# Patient Record
Sex: MALE | Race: ASIAN | NOT HISPANIC OR LATINO | Employment: FULL TIME | URBAN - METROPOLITAN AREA
[De-identification: names, ages, dates, MRNs, and addresses within clinical notes are randomized per-mention and may not be internally consistent; named-entity substitution may affect disease eponyms.]

---

## 2023-09-19 ENCOUNTER — DOCUMENTATION (OUTPATIENT)
Dept: CARDIOLOGY | Facility: HOSPITAL | Age: 58
End: 2023-09-19
Payer: COMMERCIAL

## 2024-01-04 DIAGNOSIS — E78.00 PURE HYPERCHOLESTEROLEMIA, UNSPECIFIED: ICD-10-CM

## 2024-01-04 RX ORDER — ATORVASTATIN CALCIUM 80 MG/1
80 TABLET, FILM COATED ORAL DAILY
Qty: 90 TABLET | Refills: 1 | Status: SHIPPED | OUTPATIENT
Start: 2024-01-04

## 2024-04-19 PROBLEM — R93.1 AGATSTON CORONARY ARTERY CALCIUM SCORE GREATER THAN 400: Status: ACTIVE | Noted: 2024-04-19

## 2024-04-19 PROBLEM — I25.10 3-VESSEL CAD: Status: ACTIVE | Noted: 2024-04-19

## 2024-04-19 PROBLEM — E55.9 VITAMIN D DEFICIENCY: Status: ACTIVE | Noted: 2024-04-19

## 2024-04-19 PROBLEM — R07.89 CHEST PAIN, MIDSTERNAL: Status: ACTIVE | Noted: 2024-04-19

## 2024-04-19 PROBLEM — E11.9 DIABETES (MULTI): Status: ACTIVE | Noted: 2024-04-19

## 2024-04-19 PROBLEM — I10 BENIGN ESSENTIAL HYPERTENSION: Status: ACTIVE | Noted: 2024-04-19

## 2024-04-19 PROBLEM — E78.00 PURE HYPERCHOLESTEROLEMIA: Status: ACTIVE | Noted: 2024-04-19

## 2024-04-19 PROBLEM — Z95.1 S/P CABG X 4: Status: ACTIVE | Noted: 2024-04-19

## 2024-05-13 ENCOUNTER — OFFICE VISIT (OUTPATIENT)
Dept: CARDIOLOGY | Facility: CLINIC | Age: 59
End: 2024-05-13
Payer: COMMERCIAL

## 2024-05-13 VITALS
OXYGEN SATURATION: 98 % | SYSTOLIC BLOOD PRESSURE: 130 MMHG | WEIGHT: 147.2 LBS | BODY MASS INDEX: 23.76 KG/M2 | DIASTOLIC BLOOD PRESSURE: 75 MMHG | HEART RATE: 78 BPM

## 2024-05-13 DIAGNOSIS — E78.00 PURE HYPERCHOLESTEROLEMIA: Primary | ICD-10-CM

## 2024-05-13 DIAGNOSIS — I10 BENIGN ESSENTIAL HYPERTENSION: ICD-10-CM

## 2024-05-13 DIAGNOSIS — Z95.1 S/P CABG X 4: ICD-10-CM

## 2024-05-13 DIAGNOSIS — I25.10 3-VESSEL CAD: ICD-10-CM

## 2024-05-13 PROCEDURE — 3078F DIAST BP <80 MM HG: CPT | Performed by: INTERNAL MEDICINE

## 2024-05-13 PROCEDURE — 99213 OFFICE O/P EST LOW 20 MIN: CPT | Performed by: INTERNAL MEDICINE

## 2024-05-13 PROCEDURE — 93000 ELECTROCARDIOGRAM COMPLETE: CPT | Performed by: INTERNAL MEDICINE

## 2024-05-13 PROCEDURE — 3074F SYST BP LT 130 MM HG: CPT | Performed by: INTERNAL MEDICINE

## 2024-05-13 RX ORDER — METFORMIN HYDROCHLORIDE 500 MG/1
500 TABLET, EXTENDED RELEASE ORAL
COMMUNITY

## 2024-05-13 RX ORDER — LINAGLIPTIN 5 MG/1
5 TABLET, FILM COATED ORAL DAILY
COMMUNITY
Start: 2024-04-30

## 2024-05-13 RX ORDER — EZETIMIBE 10 MG/1
10 TABLET ORAL DAILY
COMMUNITY

## 2024-05-13 RX ORDER — ASPIRIN 81 MG/1
1 TABLET ORAL DAILY
COMMUNITY

## 2024-05-13 NOTE — PROGRESS NOTES
"Make Chief Complaint:   Annual Exam     History Of Present Illness:    Piotr Lagos is a 58 y.o. male presenting with cardiovascular disease.  Patient denies chest pain/SOB/palpitations/dizziness/lightheadedness/edema/claudication  Active-plays basketball daily,1-4 hours       Last Recorded Vitals:  Vitals:    05/13/24 1506 05/13/24 1532   BP: 104/60 130/75   BP Location: Left arm    Patient Position: Sitting    Pulse: 78    SpO2: 98%    Weight: 66.8 kg (147 lb 3.2 oz)             Allergies:  Patient has no known allergies.    Outpatient Medications:  Current Outpatient Medications   Medication Instructions    aspirin 81 mg EC tablet 1 tablet, oral, Daily    atorvastatin (LIPITOR) 80 mg, oral, Daily    ezetimibe (ZETIA) 10 mg, oral, Daily    metFORMIN XR (GLUCOPHAGE-XR) 500 mg, oral, 2 times daily with meals    multivit-min/ferrous fumarate (MULTI VITAMIN ORAL) 1 tablet, oral, Daily    Tradjenta 5 mg, oral, Daily       Physical Exam:  Constitutional:       Appearance: Healthy appearance. Not in distress.   Neck:      Vascular: No JVR. JVD normal.   Pulmonary:      Effort: Pulmonary effort is normal.      Breath sounds: Normal breath sounds. No wheezing. No rhonchi. No rales.   Chest:      Chest wall: Not tender to palpatation.   Cardiovascular:      PMI at left midclavicular line. Normal rate. Regular rhythm. Normal S1. Normal S2.       Murmurs: There is no murmur.      No gallop.  No click. No rub.   Pulses:     Intact distal pulses.   Edema:     Peripheral edema absent.   Abdominal:      General: Bowel sounds are normal.      Palpations: Abdomen is soft.      Tenderness: There is no abdominal tenderness.   Musculoskeletal: Normal range of motion.         General: No tenderness. Skin:     General: Skin is warm and dry.   Neurological:      General: No focal deficit present.      Mental Status: Alert and oriented to person, place and time.          Last Labs:  CBC -  No results found for: \"WBC\", \"HGB\", \"HCT\", " "\"MCV\", \"PLT\"    CMP -  Lab Results   Component Value Date    CALCIUM 9.4 03/01/2019    PROT 7.4 03/01/2019    ALBUMIN 4.7 03/01/2019    AST 22 03/01/2019    ALT 31 03/01/2019    ALKPHOS 56 03/01/2019    BILITOT 1.2 03/01/2019       LIPID PANEL -   Lab Results   Component Value Date    CHOL 193 03/01/2019    TRIG 146 03/01/2019    HDL 55.5 03/01/2019    CHHDL 3.5 03/01/2019    LDLF 108 (H) 03/01/2019    VLDL 29 03/01/2019 7/2023   LDL=52  HDL=57    RENAL FUNCTION PANEL -   Lab Results   Component Value Date    GLUCOSE 146 (H) 03/01/2019     03/01/2019    K 4.7 03/01/2019     03/01/2019    CO2 32 03/01/2019    ANIONGAP 10 03/01/2019    BUN 14 03/01/2019    CREATININE 0.98 03/01/2019    CALCIUM 9.4 03/01/2019    ALBUMIN 4.7 03/01/2019        Lab Results   Component Value Date    HGBA1C 7.4 03/01/2019                 Lab review: I have personally reviewed the laboratory result(s)       Problem List Items Addressed This Visit       3-vessel CAD    Overview     Patient with NSTEMI 4/2017 requiring 4-vessel CABG   No current CP or acute ischemic EKG changes and good exercise tolerance, and 3/2022 stress test w/excellent exercise tolerance w/no ischemia   On statin / ASA … no beta blocker because of bradycardia   Discussed heart-healthy lifestyle          Benign essential hypertension    Overview     BP well controlled in office again today (5/19/2023)   No current BP meds         Relevant Orders    ECG 12 Lead (Completed)    Pure hypercholesterolemia - Primary    Overview     On high-intensity statin   7/2019 lipids with LDL=78   Added ezetimibe   2/2021 LDL=74   Discussed plant based diet   7/2023 lipids excellent           S/P CABG x 4    Overview     4/27/2017 LIMA-LAD, SVG-D, SVG-OM, SVG-PDA              Lipids July    Camron Lopez, DO  "

## 2024-06-18 DIAGNOSIS — E78.00 PURE HYPERCHOLESTEROLEMIA: Primary | ICD-10-CM

## 2024-06-19 RX ORDER — EZETIMIBE 10 MG/1
10 TABLET ORAL DAILY
Qty: 90 TABLET | Refills: 3 | Status: SHIPPED | OUTPATIENT
Start: 2024-06-19

## 2024-07-27 DIAGNOSIS — E78.00 PURE HYPERCHOLESTEROLEMIA, UNSPECIFIED: ICD-10-CM

## 2024-07-30 RX ORDER — ATORVASTATIN CALCIUM 80 MG/1
80 TABLET, FILM COATED ORAL DAILY
Qty: 90 TABLET | Refills: 2 | Status: SHIPPED | OUTPATIENT
Start: 2024-07-30

## 2025-05-09 LAB
CHOLEST SERPL-MCNC: 123 MG/DL
CHOLEST/HDLC SERPL: 2.1 (CALC)
HDLC SERPL-MCNC: 59 MG/DL
LDLC SERPL CALC-MCNC: 51 MG/DL (CALC)
NONHDLC SERPL-MCNC: 64 MG/DL (CALC)
TRIGL SERPL-MCNC: 58 MG/DL

## 2025-05-12 ENCOUNTER — APPOINTMENT (OUTPATIENT)
Dept: CARDIOLOGY | Facility: CLINIC | Age: 60
End: 2025-05-12
Payer: COMMERCIAL

## 2025-05-28 ENCOUNTER — APPOINTMENT (OUTPATIENT)
Dept: CARDIOLOGY | Facility: CLINIC | Age: 60
End: 2025-05-28
Payer: COMMERCIAL

## 2025-05-28 VITALS
DIASTOLIC BLOOD PRESSURE: 62 MMHG | BODY MASS INDEX: 24.02 KG/M2 | SYSTOLIC BLOOD PRESSURE: 100 MMHG | OXYGEN SATURATION: 96 % | WEIGHT: 148.8 LBS | HEART RATE: 90 BPM

## 2025-05-28 DIAGNOSIS — E78.00 PURE HYPERCHOLESTEROLEMIA, UNSPECIFIED: ICD-10-CM

## 2025-05-28 DIAGNOSIS — E78.00 PURE HYPERCHOLESTEROLEMIA: Primary | ICD-10-CM

## 2025-05-28 DIAGNOSIS — I25.10 3-VESSEL CAD: ICD-10-CM

## 2025-05-28 DIAGNOSIS — I10 BENIGN ESSENTIAL HYPERTENSION: ICD-10-CM

## 2025-05-28 DIAGNOSIS — Z95.1 S/P CABG X 4: ICD-10-CM

## 2025-05-28 PROBLEM — R68.82 LOSS OF LIBIDO: Status: ACTIVE | Noted: 2025-05-28

## 2025-05-28 PROCEDURE — 3078F DIAST BP <80 MM HG: CPT | Performed by: INTERNAL MEDICINE

## 2025-05-28 PROCEDURE — 1036F TOBACCO NON-USER: CPT | Performed by: INTERNAL MEDICINE

## 2025-05-28 PROCEDURE — 99202 OFFICE O/P NEW SF 15 MIN: CPT | Performed by: INTERNAL MEDICINE

## 2025-05-28 PROCEDURE — 93005 ELECTROCARDIOGRAM TRACING: CPT | Performed by: INTERNAL MEDICINE

## 2025-05-28 PROCEDURE — 3074F SYST BP LT 130 MM HG: CPT | Performed by: INTERNAL MEDICINE

## 2025-05-28 PROCEDURE — 99214 OFFICE O/P EST MOD 30 MIN: CPT | Performed by: INTERNAL MEDICINE

## 2025-05-28 RX ORDER — ATORVASTATIN CALCIUM 80 MG/1
40 TABLET, FILM COATED ORAL DAILY
Qty: 90 TABLET | Refills: 2 | Status: SHIPPED | COMMUNITY
Start: 2025-05-28

## 2025-05-28 NOTE — PROGRESS NOTES
Make Chief Complaint:   Annual Exam and Hypertension     History Of Present Illness:    Piotr Lagos is a 59 y.o. male presenting with cardiovascular disease.  Patient denies chest pain/SOB/palpitations/dizziness/lightheadedness/edema/claudication  Active-exercise 6 times per week       Last Recorded Vitals:  Vitals:    05/28/25 1415 05/28/25 1435   BP: 118/68 100/62   BP Location: Left arm    Patient Position: Sitting    BP Cuff Size: Adult    Pulse: 90    SpO2: 96%    Weight: 67.5 kg (148 lb 12.8 oz)             Allergies:  Patient has no known allergies.    Outpatient Medications:  Current Outpatient Medications   Medication Instructions    aspirin 81 mg EC tablet 1 tablet, Daily    atorvastatin (LIPITOR) 40 mg, oral, Daily    ezetimibe (ZETIA) 10 mg, oral, Daily    metFORMIN XR (GLUCOPHAGE-XR) 500 mg, 2 times daily (morning and late afternoon)    multivit-min/ferrous fumarate (MULTI VITAMIN ORAL) 1 tablet, Daily    Tradjenta 5 mg, Daily       Physical Exam:  Constitutional:       Appearance: Healthy appearance. Not in distress.   Neck:      Vascular: No JVR. JVD normal.   Pulmonary:      Effort: Pulmonary effort is normal.      Breath sounds: Normal breath sounds. No wheezing. No rhonchi. No rales.   Chest:      Chest wall: Not tender to palpatation.   Cardiovascular:      PMI at left midclavicular line. Normal rate. Regular rhythm. Normal S1. Normal S2.       Murmurs: There is no murmur.      No gallop.  No click. No rub.   Pulses:     Intact distal pulses.   Edema:     Peripheral edema absent.   Abdominal:      General: Bowel sounds are normal.      Palpations: Abdomen is soft.      Tenderness: There is no abdominal tenderness.   Musculoskeletal: Normal range of motion.         General: No tenderness. Skin:     General: Skin is warm and dry.   Neurological:      General: No focal deficit present.      Mental Status: Alert and oriented to person, place and time.          Last Labs:  CBC -  No results found  "for: \"WBC\", \"HGB\", \"HCT\", \"MCV\", \"PLT\"    CMP -  Lab Results   Component Value Date    CALCIUM 9.4 03/01/2019    PROT 7.4 03/01/2019    ALBUMIN 4.7 03/01/2019    AST 22 03/01/2019    ALT 31 03/01/2019    ALKPHOS 56 03/01/2019    BILITOT 1.2 03/01/2019       LIPID PANEL -   Lab Results   Component Value Date    CHOL 123 05/09/2025    TRIG 58 05/09/2025    HDL 59 05/09/2025    CHHDL 2.1 05/09/2025    LDLF 108 (H) 03/01/2019    VLDL 29 03/01/2019   LDL=51    RENAL FUNCTION PANEL -   Lab Results   Component Value Date    GLUCOSE 146 (H) 03/01/2019     03/01/2019    K 4.7 03/01/2019     03/01/2019    CO2 32 03/01/2019    ANIONGAP 10 03/01/2019    BUN 14 03/01/2019    CREATININE 0.98 03/01/2019    CALCIUM 9.4 03/01/2019    ALBUMIN 4.7 03/01/2019 11/2024  Potassium 4.2  Creatinine 0.9    Lab Results   Component Value Date    HGBA1C 7.4 03/01/2019                 Lab review: I have personally reviewed the laboratory result(s)       Problem List Items Addressed This Visit       3-vessel CAD    Overview   Patient with NSTEMI 4/2017 requiring 4-vessel CABG   No current CP and good exercise tolerance, and 3/2022 stress test w/excellent exercise tolerance w/no ischemia   On statin / ASA … no beta blocker because of bradycardia   Discussed heart-healthy lifestyle          Relevant Orders    ECG 12 lead (Clinic Performed) (Completed)    Benign essential hypertension    Overview   BP well controlled in office again today (5/19/2023)   No current BP meds         Relevant Orders    ECG 12 lead (Clinic Performed) (Completed)    Pure hypercholesterolemia - Primary    Overview   On high-intensity statin   7/2019 lipids with LDL=78   Added ezetimibe   5/2025 LDL=51  Discussed plant based diet            S/P CABG x 4    Overview   4/27/2017 LIMA-LAD, SVG-D, SVG-OM, SVG-PDA            Other Visit Diagnoses         Pure hypercholesterolemia, unspecified        Relevant Medications    atorvastatin (Lipitor) 80 mg tablet      "     Decrease atorvastatin to 1/2 dose(40 mg) and see if libido improves    Camron Lopez, DO

## 2025-05-30 LAB
ATRIAL RATE: 90 BPM
P AXIS: 72 DEGREES
P OFFSET: 194 MS
P ONSET: 142 MS
PR INTERVAL: 160 MS
Q ONSET: 222 MS
QRS COUNT: 15 BEATS
QRS DURATION: 88 MS
QT INTERVAL: 364 MS
QTC CALCULATION(BAZETT): 445 MS
QTC FREDERICIA: 416 MS
R AXIS: 52 DEGREES
T AXIS: 49 DEGREES
T OFFSET: 404 MS
VENTRICULAR RATE: 90 BPM